# Patient Record
Sex: FEMALE
[De-identification: names, ages, dates, MRNs, and addresses within clinical notes are randomized per-mention and may not be internally consistent; named-entity substitution may affect disease eponyms.]

---

## 2017-09-24 ENCOUNTER — HEALTH MAINTENANCE LETTER (OUTPATIENT)
Age: 42
End: 2017-09-24

## 2018-05-07 ENCOUNTER — TRANSFERRED RECORDS (OUTPATIENT)
Dept: HEALTH INFORMATION MANAGEMENT | Facility: CLINIC | Age: 43
End: 2018-05-07

## 2018-05-07 LAB
ALT SERPL-CCNC: 13 U/L (ref 13–69)
AST SERPL-CCNC: 14 U/L (ref 12–35)
CHOLEST SERPL-MCNC: 213 MG/DL (ref 90–200)
CREAT SERPL-MCNC: 0.8 MG/DL (ref 0.5–1.5)
GLUCOSE SERPL-MCNC: 88 MG/DL (ref 60–115)
HDLC SERPL-MCNC: 58 MG/DL
LDLC SERPL CALC-MCNC: 141 MG/DL
POTASSIUM SERPL-SCNC: 4.1 MMOL/L (ref 3.6–5.1)
TRIGL SERPL-MCNC: 71 MG/DL (ref 40–197)
TSH SERPL-ACNC: 2.55 UIU/ML (ref 0.27–4.2)

## 2018-05-15 ENCOUNTER — MEDICAL CORRESPONDENCE (OUTPATIENT)
Dept: HEALTH INFORMATION MANAGEMENT | Facility: CLINIC | Age: 43
End: 2018-05-15

## 2018-05-15 ENCOUNTER — TRANSFERRED RECORDS (OUTPATIENT)
Dept: HEALTH INFORMATION MANAGEMENT | Facility: CLINIC | Age: 43
End: 2018-05-15

## 2018-06-28 ENCOUNTER — OFFICE VISIT (OUTPATIENT)
Dept: ENDOCRINOLOGY | Facility: CLINIC | Age: 43
End: 2018-06-28
Payer: COMMERCIAL

## 2018-06-28 VITALS
BODY MASS INDEX: 27.96 KG/M2 | HEART RATE: 85 BPM | HEIGHT: 63 IN | DIASTOLIC BLOOD PRESSURE: 70 MMHG | WEIGHT: 157.8 LBS | SYSTOLIC BLOOD PRESSURE: 115 MMHG | TEMPERATURE: 98.5 F | OXYGEN SATURATION: 97 %

## 2018-06-28 DIAGNOSIS — R79.89 LOW SERUM TRIIODOTHYRONINE (T3): ICD-10-CM

## 2018-06-28 DIAGNOSIS — R53.82 CHRONIC FATIGUE: ICD-10-CM

## 2018-06-28 LAB
CORTIS SERPL-MCNC: 10.9 UG/DL (ref 4–22)
T3FREE SERPL-MCNC: 2.4 PG/ML (ref 2.3–4.2)
T4 FREE SERPL-MCNC: 0.86 NG/DL (ref 0.76–1.46)
TSH SERPL DL<=0.005 MIU/L-ACNC: 2.68 MU/L (ref 0.4–4)

## 2018-06-28 PROCEDURE — 82533 TOTAL CORTISOL: CPT | Performed by: INTERNAL MEDICINE

## 2018-06-28 PROCEDURE — 36415 COLL VENOUS BLD VENIPUNCTURE: CPT | Performed by: INTERNAL MEDICINE

## 2018-06-28 PROCEDURE — 84443 ASSAY THYROID STIM HORMONE: CPT | Performed by: INTERNAL MEDICINE

## 2018-06-28 PROCEDURE — 84439 ASSAY OF FREE THYROXINE: CPT | Performed by: INTERNAL MEDICINE

## 2018-06-28 PROCEDURE — 84481 FREE ASSAY (FT-3): CPT | Performed by: INTERNAL MEDICINE

## 2018-06-28 PROCEDURE — 99204 OFFICE O/P NEW MOD 45 MIN: CPT | Performed by: INTERNAL MEDICINE

## 2018-06-28 PROCEDURE — 82306 VITAMIN D 25 HYDROXY: CPT | Performed by: INTERNAL MEDICINE

## 2018-06-28 NOTE — PATIENT INSTRUCTIONS
[facility]   Dr Borrero, Endocrinology Department      [facility]   [address] [address]  Appointment [phone]  Fax: [phone]   Monday and Tuesday         [place] [address] [place] [address]  Appointment [phone]  Fax: [phone]  Wednesday and Thursday           Labs today  Further workup based on labs

## 2018-06-28 NOTE — MR AVS SNAPSHOT
After Visit Summary   2018    Graciela Pacheco    MRN: [id number]           Patient Information     Date Of Birth          1975        Visit Information        Provider Department      2018 8:30 AM Katie Borrero MD [facility]        Today's Diagnoses     Chronic fatigue        Low serum triiodothyronine (T3)          Care Instructions    [facility] [address] & [place] locations   Dr Borrero, Endocrinology Department      [facility] [address]   [address] [address]  Appointment [phone]  Fax: [phone]   Monday and Tuesday         [facility]   [address] [facility] [place] [address]  Appointment [phone]  Fax: [phone]  Wednesday and Thursday           Labs today  Further workup based on labs          Follow-ups after your visit        Who to contact     If you have questions or need follow up information about today's clinic visit or your schedule please contact [facility] directly at [phone].  Normal or non-critical lab and imaging results will be communicated to you by [employer], letter or phone within 4 business days after the clinic has received the results. If you do not hear from us within 7 days, please contact the clinic through [employer] or phone. If you have a critical or abnormal lab result, we will notify you by phone as soon as possible.  Submit refill requests through [employer] or call your pharmacy and they will forward the refill request to us. Please allow 3 business days for your refill to be completed.          Additional Information About Your Visit        MyChart Information     [employer] gives you secure access to your electronic health record. If you see a primary care provider, you can also send messages to your care team and make appointments. If you have questions, please call your primary care clinic.  If you do  "not have a primary care provider, please call [phone] and they will assist you.        Care EveryWhere ID     This is your Care EveryWhere ID. This could be used by other organizations to access your [facility] medical records  [id number]        Your Vitals Were     Pulse Temperature Height Last Period Pulse Oximetry Breastfeeding?    85 98.5  F (36.9  C) (Oral) 1.6 m (5' 3\") 06/19/2018 (Approximate) 97% No    BMI (Body Mass Index)                   27.95 kg/m2            Blood Pressure from Last 3 Encounters:   06/28/18 115/70    Weight from Last 3 Encounters:   06/28/18 71.6 kg (157 lb 12.8 oz)              We Performed the Following     Cortisol     T3 Free     T4 free     TSH     Vitamin D Deficiency        Primary Care Provider Office Phone # Fax #    Antonia Jovita Feliciano [phone]       [place] [address]        Equal Access to Services     RONEL GONZALEZ : Hadii bhaskar ku hadasho Soomaali, waaxda luqadaha, qaybta kaalmada adeegyada, waxay rebecca hayerum mccain . So [facility] [phone].    ATENCIÓN: Si habla español, tiene a caballero disposición servicios gratuitos de asistencia lingüística. Llame al [phone].    We comply with applicable federal civil rights laws and [place] laws. We do not discriminate on the basis of race, color, national origin, age, disability, sex, sexual orientation, or gender identity.            Thank you!     Thank you for choosing [facility]  for your care. Our goal is always to provide you with excellent care. Hearing back from our patients is one way we can continue to improve our services. Please take a few minutes to complete the written survey that you may receive in the mail after your visit with us. Thank you!             Your Updated Medication List - Protect others around you: Learn how to safely use, store and throw away your medicines at www.disposemymeds.org.      Notice  As of " 6/28/2018  9:06 AM    You have not been prescribed any medications.

## 2018-06-28 NOTE — NURSING NOTE
"Vital signs:  Temp: 98.5  F (36.9  C) Temp src: Oral BP: 115/70 Pulse: 85     SpO2: 97 %     Height: 160 cm (5' 3\") Weight: 71.6 kg (157 lb 12.8 oz)  Estimated body mass index is 27.95 kg/(m^2) as calculated from the following:    Height as of this encounter: 1.6 m (5' 3\").    Weight as of this encounter: 71.6 kg (157 lb 12.8 oz).    SMA Carolyn        "

## 2018-06-28 NOTE — LETTER
6/28/2018         RE: Graciela Pacheco  [address] [address]        Dear Colleague,    Thank you for referring your patient, Graciela Pacheco, to the [facility]. Please see a copy of my visit note below.    ENDOCRINOLOGY CLINIC NOTE:    Name: Graciela Pacheco  Self referred for thyroid problems?.  HPI:  Graciela Pacheco is a 42 year old female who presents for the evaluation of thyroid dysfunction. .was getting care at [facility].  Pt has some records and labs with her which were reviewed personally.    #1 Hypothyroidism.  Strong family history of thyroid problem.  Mother,mgm,lia-- thyroid problems.  Mother with hypothyroidism, mgm with goiter.    Felling tired X 1 year back.  Labs 5/2018 at OSH showed normal TSH, FT4, neg TPO and TG Ab. FT3 was slightly low 2.2.    She is concerned given her strong FH of thyroid problems and symptoms she has thyroid problem and is wondering if she needs t/t.    Palpitations:  No  Changes to hair or skin: Yes: more dry skin  Diarrhea/Constipation:mostly constipation  Changes in menses: regular but heavy  Changes in vision:once in a while blurry vision. Followed by optho  Dysphagia or Shortness of breath:No  Tremors:No  Difficulty sleeping: sleeps longer. Sometimes snores.  Changes in weight: + wt gain.5-8 lbs in last year  Wt Readings from Last 2 Encounters:   06/28/18 71.6 kg (157 lb 12.8 oz)     Heat or cold intolerance: + cold intolerance. Not new. But getting worse.  History of Lithium or Amiodarone use:No  Head or neck surgery/radiation:No  IV Contrast: No  Family History of Thyroid Problems: as above.  PMH/PSH:  History reviewed. No pertinent past medical history.  History reviewed. No pertinent surgical history.  Family Hx:  Family History   Problem Relation Age of Onset     Thyroid Disease Mother      Other Cancer Maternal Grandmother      Colon Cancer Maternal Grandmother      Other Cancer Maternal Grandfather   "    Diabetes Paternal Grandmother      Colon Cancer Paternal Grandmother      Social Hx:  Social History     Social History     Marital status:      Spouse name: N/A     Number of children: N/A     Years of education: N/A     Occupational History     Not on file.     Social History Main Topics     Smoking status: Never Smoker     Smokeless tobacco: Never Used     Alcohol use Not on file     Drug use: Not on file     Sexual activity: Not on file     Other Topics Concern     Not on file     Social History Narrative     No narrative on file          MEDICATIONS:  currently has no medications in their medication list.    ROS   ROS: 10 point ROS neg other than the symptoms noted above in the HPI.    Physical Exam   VS: /70 (BP Location: Left arm, Patient Position: Chair, Cuff Size: Adult Regular)  Pulse 85  Temp 98.5  F (36.9  C) (Oral)  Ht 1.6 m (5' 3\")  Wt 71.6 kg (157 lb 12.8 oz)  LMP 06/19/2018 (Approximate)  SpO2 97%  Breastfeeding? No  BMI 27.95 kg/m2  GENERAL: AXOX3, NAD, well dressed, answering questions appropriately, appears stated age.  HEENT: No exopthalmous, no proptosis, EOMI, no lig lag, no retraction  NECK: Thyroid normal in size, non tender, no nodules were palpated.  CV: RRR, no rubs, gallops, no murmurs  LUNGS: CTAB, no wheezes, rales, or ronchi  ABDOMEN: +BS  EXTREMITIES: no edema, +pulses, no rashes, no lesions  NEUROLOGY: CN grossly intact, no tremors  MSK: grossly intact   PSYCH: normal affect and mood    LABS:  TFTs:  5/2018:  TSH: 2.55 (0.27-4.2)  FT4: 0.87 (0.7-1.85)  FT3: 2.2 (2.4-4.2)    TG/TPO:   TPO 1.1 (0.0-9.0)  TG AB <0.9 (0.0-4.0)    All pertinent notes, labs, and images personally reviewed by me.     A/P  Ms.Nicole Kate Pacheco is a 42 year old here for the evaluation of hypothyroidism:    1. Fatigue:   I discussed that this can be multifactorial.  She does have strong family history but recent labs done at outside clinic showed normal TSH.  TSH is important " screening test for thyroid.  Other labs showed normal free T4.  Negative TPO antibodies and thyroglobulin antibodies.  Free T3 slightly low 2.2  Plan to repeat thyroid function tests today.  We will also screen with cortisol and also get vitamin D levels  I discussed with patient that her symptoms can be multifactorial.  As long as thyroid labs are in normal range for thyroid hormone replacement is indicated as there is concern for side effects from that  Labs today  Further workup based on that.    Follow-up:  Based on labs.    Katie Borrero MD  Endocrinology  [facility]  CC: Antonia Feliciano    More than 50% of face to face time spent with Ms. Pacheco on counseling / coordinating her care.      All questions were answered.  The patient indicates understanding of the above issues and agrees with the plan set forth.     Addendum to above note and clinic visit:    Labs reviewed.    See result note/telephone encounter.            Again, thank you for allowing me to participate in the care of your patient.        Sincerely,        Katie Borrero MD

## 2018-06-28 NOTE — PROGRESS NOTES
ENDOCRINOLOGY CLINIC NOTE:    Name: Graciela Pacheco  Self referred for thyroid problems?.  HPI:  Graciela Pacheco is a 42 year old female who presents for the evaluation of thyroid dysfunction. .was getting care at [facility].  Pt has some records and labs with her which were reviewed personally.    #1 Hypothyroidism.  Strong family history of thyroid problem.  Mother,shima,lia-- thyroid problems.  Mother with hypothyroidism, mgm with goiter.    Felling tired X 1 year back.  Labs 5/2018 at OSH showed normal TSH, FT4, neg TPO and TG Ab. FT3 was slightly low 2.2.    She is concerned given her strong FH of thyroid problems and symptoms she has thyroid problem and is wondering if she needs t/t.    Palpitations:  No  Changes to hair or skin: Yes: more dry skin  Diarrhea/Constipation:mostly constipation  Changes in menses: regular but heavy  Changes in vision:once in a while blurry vision. Followed by optho  Dysphagia or Shortness of breath:No  Tremors:No  Difficulty sleeping: sleeps longer. Sometimes snores.  Changes in weight: + wt gain.5-8 lbs in last year  Wt Readings from Last 2 Encounters:   06/28/18 71.6 kg (157 lb 12.8 oz)     Heat or cold intolerance: + cold intolerance. Not new. But getting worse.  History of Lithium or Amiodarone use:No  Head or neck surgery/radiation:No  IV Contrast: No  Family History of Thyroid Problems: as above.  PMH/PSH:  History reviewed. No pertinent past medical history.  History reviewed. No pertinent surgical history.  Family Hx:  Family History   Problem Relation Age of Onset     Thyroid Disease Mother      Other Cancer Maternal Grandmother      Colon Cancer Maternal Grandmother      Other Cancer Maternal Grandfather      Diabetes Paternal Grandmother      Colon Cancer Paternal Grandmother      Social Hx:  Social History     Social History     Marital status:      Spouse name: N/A     Number of children: N/A     Years of education: N/A     Occupational History  "    Not on file.     Social History Main Topics     Smoking status: Never Smoker     Smokeless tobacco: Never Used     Alcohol use Not on file     Drug use: Not on file     Sexual activity: Not on file     Other Topics Concern     Not on file     Social History Narrative     No narrative on file          MEDICATIONS:  currently has no medications in their medication list.    ROS   ROS: 10 point ROS neg other than the symptoms noted above in the HPI.    Physical Exam   VS: /70 (BP Location: Left arm, Patient Position: Chair, Cuff Size: Adult Regular)  Pulse 85  Temp 98.5  F (36.9  C) (Oral)  Ht 1.6 m (5' 3\")  Wt 71.6 kg (157 lb 12.8 oz)  LMP 06/19/2018 (Approximate)  SpO2 97%  Breastfeeding? No  BMI 27.95 kg/m2  GENERAL: AXOX3, NAD, well dressed, answering questions appropriately, appears stated age.  HEENT: No exopthalmous, no proptosis, EOMI, no lig lag, no retraction  NECK: Thyroid normal in size, non tender, no nodules were palpated.  CV: RRR, no rubs, gallops, no murmurs  LUNGS: CTAB, no wheezes, rales, or ronchi  ABDOMEN: +BS  EXTREMITIES: no edema, +pulses, no rashes, no lesions  NEUROLOGY: CN grossly intact, no tremors  MSK: grossly intact   PSYCH: normal affect and mood    LABS:  TFTs:  5/2018:  TSH: 2.55 (0.27-4.2)  FT4: 0.87 (0.7-1.85)  FT3: 2.2 (2.4-4.2)    TG/TPO:   TPO 1.1 (0.0-9.0)  TG AB <0.9 (0.0-4.0)    All pertinent notes, labs, and images personally reviewed by me.     A/P  Ms.Nicole Kate Pacheco is a 42 year old here for the evaluation of hypothyroidism:    1. Fatigue:   I discussed that this can be multifactorial.  She does have strong family history but recent labs done at outside clinic showed normal TSH.  TSH is important screening test for thyroid.  Other labs showed normal free T4.  Negative TPO antibodies and thyroglobulin antibodies.  Free T3 slightly low 2.2  Plan to repeat thyroid function tests today.  We will also screen with cortisol and also get vitamin D levels  I " discussed with patient that her symptoms can be multifactorial.  As long as thyroid labs are in normal range for thyroid hormone replacement is indicated as there is concern for side effects from that  Labs today  Further workup based on that.    Follow-up:  Based on labs.    Katie Borrero MD  Endocrinology  [facility]  CC: Antonia Feliciano    More than 50% of face to face time spent with Ms. Pacheco on counseling / coordinating her care.      All questions were answered.  The patient indicates understanding of the above issues and agrees with the plan set forth.     Addendum to above note and clinic visit:    Labs reviewed.    See result note/telephone encounter.

## 2018-06-29 LAB — DEPRECATED CALCIDIOL+CALCIFEROL SERPL-MC: 27 UG/L (ref 20–75)

## 2019-11-09 ENCOUNTER — HEALTH MAINTENANCE LETTER (OUTPATIENT)
Age: 44
End: 2019-11-09

## 2020-02-23 ENCOUNTER — HEALTH MAINTENANCE LETTER (OUTPATIENT)
Age: 45
End: 2020-02-23

## 2020-12-06 ENCOUNTER — HEALTH MAINTENANCE LETTER (OUTPATIENT)
Age: 45
End: 2020-12-06

## 2021-09-26 ENCOUNTER — HEALTH MAINTENANCE LETTER (OUTPATIENT)
Age: 46
End: 2021-09-26

## 2022-01-16 ENCOUNTER — HEALTH MAINTENANCE LETTER (OUTPATIENT)
Age: 47
End: 2022-01-16

## 2022-10-13 ENCOUNTER — LAB REQUISITION (OUTPATIENT)
Dept: LAB | Facility: CLINIC | Age: 47
End: 2022-10-13

## 2022-10-13 DIAGNOSIS — Z01.419 ENCOUNTER FOR GYNECOLOGICAL EXAMINATION (GENERAL) (ROUTINE) WITHOUT ABNORMAL FINDINGS: ICD-10-CM

## 2022-10-13 PROCEDURE — G0145 SCR C/V CYTO,THINLAYER,RESCR: HCPCS | Performed by: OBSTETRICS & GYNECOLOGY

## 2022-10-13 PROCEDURE — 87624 HPV HI-RISK TYP POOLED RSLT: CPT | Performed by: OBSTETRICS & GYNECOLOGY

## 2022-10-17 LAB
BKR LAB AP GYN ADEQUACY: NORMAL
BKR LAB AP GYN INTERPRETATION: NORMAL
BKR LAB AP HPV REFLEX: NORMAL
BKR LAB AP LMP: NORMAL
BKR LAB AP PREVIOUS ABNL DX: NORMAL
BKR LAB AP PREVIOUS ABNORMAL: NORMAL
PATH REPORT.COMMENTS IMP SPEC: NORMAL
PATH REPORT.COMMENTS IMP SPEC: NORMAL
PATH REPORT.RELEVANT HX SPEC: NORMAL

## 2022-10-19 LAB
HUMAN PAPILLOMA VIRUS 16 DNA: NEGATIVE
HUMAN PAPILLOMA VIRUS 18 DNA: NEGATIVE
HUMAN PAPILLOMA VIRUS FINAL DIAGNOSIS: NORMAL
HUMAN PAPILLOMA VIRUS OTHER HR: NEGATIVE

## 2022-11-20 ENCOUNTER — HEALTH MAINTENANCE LETTER (OUTPATIENT)
Age: 47
End: 2022-11-20

## 2022-12-24 ENCOUNTER — HEALTH MAINTENANCE LETTER (OUTPATIENT)
Age: 47
End: 2022-12-24

## 2023-04-15 ENCOUNTER — HEALTH MAINTENANCE LETTER (OUTPATIENT)
Age: 48
End: 2023-04-15

## 2024-01-28 ENCOUNTER — HEALTH MAINTENANCE LETTER (OUTPATIENT)
Age: 49
End: 2024-01-28

## 2025-06-21 ENCOUNTER — HEALTH MAINTENANCE LETTER (OUTPATIENT)
Age: 50
End: 2025-06-21